# Patient Record
Sex: MALE | Race: BLACK OR AFRICAN AMERICAN | NOT HISPANIC OR LATINO | URBAN - METROPOLITAN AREA
[De-identification: names, ages, dates, MRNs, and addresses within clinical notes are randomized per-mention and may not be internally consistent; named-entity substitution may affect disease eponyms.]

---

## 2021-01-27 ENCOUNTER — EMERGENCY (EMERGENCY)
Facility: HOSPITAL | Age: 46
LOS: 1 days | Discharge: HOME | End: 2021-01-27
Attending: STUDENT IN AN ORGANIZED HEALTH CARE EDUCATION/TRAINING PROGRAM
Payer: MEDICAID

## 2021-01-27 VITALS
TEMPERATURE: 98 F | OXYGEN SATURATION: 100 % | DIASTOLIC BLOOD PRESSURE: 96 MMHG | SYSTOLIC BLOOD PRESSURE: 148 MMHG | HEART RATE: 61 BPM

## 2021-01-27 VITALS
TEMPERATURE: 98 F | OXYGEN SATURATION: 94 % | HEART RATE: 94 BPM | RESPIRATION RATE: 18 BRPM | SYSTOLIC BLOOD PRESSURE: 116 MMHG | DIASTOLIC BLOOD PRESSURE: 66 MMHG

## 2021-01-27 LAB — SARS-COV-2 RNA SPEC QL NAA+PROBE: SIGNIFICANT CHANGE UP

## 2021-01-27 PROCEDURE — 71045 X-RAY EXAM CHEST 1 VIEW: CPT | Mod: 26

## 2021-01-27 PROCEDURE — 99053 MED SERV 10PM-8AM 24 HR FAC: CPT

## 2021-01-27 PROCEDURE — 99284 EMERGENCY DEPT VISIT MOD MDM: CPT

## 2021-01-27 PROCEDURE — 93010 ELECTROCARDIOGRAM REPORT: CPT

## 2021-01-27 RX ORDER — AZITHROMYCIN 500 MG/1
1000 TABLET, FILM COATED ORAL ONCE
Refills: 0 | Status: COMPLETED | OUTPATIENT
Start: 2021-01-27 | End: 2021-01-27

## 2021-01-27 RX ORDER — CLARITHROMYCIN 500 MG
1 TABLET ORAL
Qty: 20 | Refills: 0
Start: 2021-01-27 | End: 2021-02-05

## 2021-01-27 RX ORDER — AZITHROMYCIN 500 MG/1
500 TABLET, FILM COATED ORAL ONCE
Refills: 0 | Status: COMPLETED | OUTPATIENT
Start: 2021-01-27 | End: 2021-01-27

## 2021-01-27 RX ORDER — ONDANSETRON 8 MG/1
4 TABLET, FILM COATED ORAL ONCE
Refills: 0 | Status: COMPLETED | OUTPATIENT
Start: 2021-01-27 | End: 2021-01-27

## 2021-01-27 RX ADMIN — AZITHROMYCIN 1000 MILLIGRAM(S): 500 TABLET, FILM COATED ORAL at 06:03

## 2021-01-27 RX ADMIN — AZITHROMYCIN 500 MILLIGRAM(S): 500 TABLET, FILM COATED ORAL at 05:51

## 2021-01-27 RX ADMIN — ONDANSETRON 4 MILLIGRAM(S): 8 TABLET, FILM COATED ORAL at 06:03

## 2021-01-27 NOTE — ED PROVIDER NOTE - OBJECTIVE STATEMENT
44 y/o M pt BIBA with no significant PMHx presents to the ED after EMS found him unresponsive. States he smoked a blunt and had one drink and does not recall how he got here or why he is here. No further complaints at this time.

## 2021-01-27 NOTE — ED PROVIDER NOTE - PATIENT PORTAL LINK FT
You can access the FollowMyHealth Patient Portal offered by [facility] by registering at the following website: [email or web address]. By joining [employer]’s FollowMyHealth portal, you will also be able to view your health information using other applications (apps) compatible with our system.

## 2021-01-27 NOTE — ED PROVIDER NOTE - NSFOLLOWUPINSTRUCTIONS_ED_ALL_ED_FT
1) Follow up with your doctor in 1-2 days  2) Return to the ER for worsening or concerning symptoms    Cannabis Abuse     WHAT YOU NEED TO KNOW:    Cannabis (marijuana) abuse is a pattern of use that causes physical or mental problems. Cannabis can make you feel high, happy, or excited. The effects may start right away and last for 3 to 4 hours depending on whether you smoke or eat cannabis.    DISCHARGE INSTRUCTIONS:    Call your local emergency number (911 in the ) if: You have any of the following signs of a heart attack:   •Squeezing, pressure, or pain in your chest      •You may also have any of the following: ?Discomfort or pain in your back, neck, jaw, stomach, or arm      ?Shortness of breath      ?Nausea or vomiting      ?Lightheadedness or a sudden cold sweat        Call your doctor if:   •You cannot fight the urge to use cannabis.      •You have stopped using cannabis, and feel that you cannot cope with your withdrawal symptoms.      •You want help or more information on how to decrease or stop using cannabis.      •You have questions or concerns about your condition or care.      Signs of cannabis abuse:   •Use prevents you from functioning at work or school, or causes you to be absent often or to do poor work      •Use when it is dangerous to be under the effects of the drug, such as when you are driving a vehicle or using machinery       •Problems with the police when you are under the effects of cannabis      •Continuing to use cannabis even when you argue with your family and friends about your use      •More cannabis is needed to give you the high feeling or other effects that you want      •Withdrawal symptoms after you stop using cannabis      How cannabis affects your baby: Cannabis can affect your baby if you use it while you are pregnant. Cannabis use may keep your unborn baby from growing as fast as he or she should. It may harm your unborn baby's eyes and nervous system. When your baby gets older, he or she may have difficulty in school and with solving problems. He or she may also have a poor memory, or problems focusing or paying attention. Your child may be impulsive (reacting without thinking first). He or she may be at an increased risk for depression. He or she may have a higher risk of smoking cigarettes and using cannabis as an adult.    Signs of cannabis withdrawal: Cannabis withdrawal happens when you have used cannabis for a long period of time, and you suddenly take less or stop taking it. Withdrawal symptoms may start on the first day and may last up to 2 weeks. You may have more than one of the following:   •Decreased appetite and weight loss       •Night sweats and trouble sleeping      •Craving for cannabis      •Irritability       •Feeling agitated, anxious, or restless      •Depressed or negative mood      Treatment: Drug treatment or therapy is often used to treat cannabis abuse. You may need to stay in a treatment facility, or you may have outpatient therapy sessions. Therapy can be done with you and a talk therapist or in a group with others. This can help you learn good coping skills and ways to manage stress. The goal is to help you decrease or stop cannabis abuse in manageable steps. Your healthcare provider can give you more information about available drug and therapy treatments.    Follow up with your healthcare provider as directed: Write down your questions so you remember to ask them during your visits.     For more information:   •National [place] on Drug Abuse  [address] [facility]  [place], [id number]  Phone: 1-569.710.8603  Web Address: www.[place].nih.gov    Opioid Use Disorder    WHAT YOU NEED TO KNOW:    Opioid use disorder (OUD) is a condition that causes you to abuse and become dependent on an opioid. Abuse means you continue to use the opioid even though it is hurting you or others. Dependence means your body gets used to the amount you take. OUD prevents you from controlling your opioid use. This causes distress that affects your life. You also have trouble doing daily activities because of physical and mental problems from the opioid. OUD can happen with an illegal opioid such as heroin, or a prescription opioid such as hydrocodone or fentanyl.    DISCHARGE INSTRUCTIONS:    Call your local emergency number (911 in the ), or have someone else call if:   •You have chest pain or trouble breathing.      •You have a seizure.      •You cannot be woken.      Call your doctor if:   •You have trouble staying awake and your breathing is slow or shallow.      •You have a fast, slow, or irregular heartbeat.      •You feel lightheaded or faint.      •Your speech is slurred, or you are confused.      •You have nausea and are vomiting, or you cannot stop vomiting.      •You have balance problems.      •You have questions or concerns about your condition or care.      What you need to know about opioid medicine safety:   •Do not suddenly stop taking the opioid. If you have been taking the opioid for longer than 2 weeks, a sudden stop may cause dangerous side effects. Work with your healthcare provider to decrease your dose slowly.      •Do not take opioids that belong to someone else. The kind or amount that person takes may not be right for you.      •Do not mix opioids with other medicines or alcohol. The combination can cause an overdose, or cause you to stop breathing. Alcohol, sleeping pills, and medicines such as antihistamines can make you sleepy. A combination with opioids can lead to a coma.      •Learn about the signs of an overdose so you know how to respond. Tell others about these signs so they will know what to do if needed. Talk to your healthcare provider about naloxone. You may be able to keep naloxone at home in case of an overdose. Your family and friends can also be trained on how to give it to you if needed.      •Take prescribed opioids exactly as directed. Do not take more than the recommended amount. Do not take it more often than recommended. If you use a pain patch, be sure to remove the old patch before you place a new one. Make sure the patch is not exposed to sunlight. Sunlight speeds up the opioid release from the patch.      •Keep opioids out of the reach of children. Store opioids in a locked cabinet or in a location that children cannot get to.      •Follow instructions for what to do with prescription opioids you do not use. Your healthcare provider will give you instructions for how to dispose of it safely. This helps make sure no one else takes it.      Follow up with your doctor or therapist as directed: Write down your questions so you remember to ask them during your visits.    For support and more information:   •Substance Abuse and Mental Health Services Administration  [address]  [place] [address]  Web Address: [email or web address].gov 1) Follow up with your doctor in 1-2 days  2) Return to the ER for worsening or concerning symptoms      Community Acquired Pneumonia    WHAT YOU NEED TO KNOW:    Community-acquired pneumonia (CAP) is a lung infection that you get outside of a hospital or nursing home setting. Your lungs become inflamed and cannot work well. CAP may be caused by bacteria, viruses, or fungi.     The Lungs         DISCHARGE INSTRUCTIONS:    Return to the emergency department if:   •You are confused and cannot think clearly.      •You have increased trouble breathing.      •Your lips or fingernails turn gray or blue.      Call your doctor if:   •Your symptoms do not get better, or they get worse.      •You are urinating less, or not at all.      •You have questions or concerns about your condition or care.      Medicines:   •Medicines may be given to treat a bacterial, viral, or fungal infection. You may also be given medicines to dilate your bronchial tubes to help you breathe more easily.      •Take your medicine as directed. Contact your healthcare provider if you think your medicine is not helping or if you have side effects. Tell him or her if you are allergic to any medicine. Keep a list of the medicines, vitamins, and herbs you take. Include the amounts, and when and why you take them. Bring the list or the pill bottles to follow-up visits. Carry your medicine list with you in case of an emergency.      Follow up with your doctor within 3 days or as directed: You may need another x-ray. Write down your questions so you remember to ask them during your visits.    Deep breathing and coughing: Deep breathing helps open the air passages in your lungs. Coughing helps bring up mucus from your lungs. Take a deep breath and hold the breath as long as you can. Then push the air out of your lungs with a deep, strong cough. Spit out any mucus you have coughed up. Take 10 deep breaths in a row every hour that you are awake. Remember to follow each deep breath with a cough.     Do not smoke or allow others to smoke around you: Nicotine and other chemicals in cigarettes and cigars can cause lung damage. Ask your healthcare provider for information if you currently smoke and need help to quit. E-cigarettes or smokeless tobacco still contain nicotine. Talk to your healthcare provider before you use these products.     Manage CAP at home:   •Breathe warm, moist air. This helps loosen mucus. Loosely place a warm, wet washcloth over your nose and mouth. A room humidifier may also help make the air moist.      •Drink liquids as directed. Ask your healthcare provider how much liquid to drink each day and which liquids to drink. Liquids help make mucus thin and easier to get out of your body.      •Gently tap your chest. This helps loosen mucus so it is easier to cough. Lie with your head lower than your chest several times a day and tap your chest.       •Get plenty of rest. Rest helps your body heal.       Prevent CAP:          •Wash your hands often. Wash your hands several times each day. Wash after you use the bathroom, change a child's diaper, and before you prepare or eat food. Use soap and water every time. Rub your soapy hands together, lacing your fingers. Wash the front and back of your hands, and in between your fingers. Use the fingers of one hand to scrub under the fingernails of the other hand. Wash for at least 20 seconds. Rinse with warm, running water for several seconds. Then dry your hands with a clean towel or paper towel. Use hand  that contains alcohol if soap and water are not available. Do not touch your eyes, nose, or mouth without washing your hands first.  Handwashing           •Cover a sneeze or cough. Use a tissue that covers your mouth and nose. Throw the tissue away in a trash can right away. Use the bend of your arm if a tissue is not available. Wash your hands well with soap and water or use a hand . Do not stand close to anyone who is sneezing or coughing.      •Clean surfaces often. Clean doorknobs, countertops, cell phones, and other surfaces that are touched often. Use a disinfecting wipe, a single-use sponge, or a cloth you can wash and reuse. Use disinfecting  if you do not have wipes. You can create a disinfecting  by mixing 1 part bleach with 10 parts water.      •Try to avoid people who have a cold or the flu. If you are sick, stay away from others as much as possible.      •Ask about vaccines you may need. You may need a vaccine to help prevent pneumonia. Get an influenza (flu) vaccine every year as soon as recommended, usually in September or October. Your healthcare provider can tell you if you should get any other vaccines, and when to get them.    Cannabis Abuse     WHAT YOU NEED TO KNOW:    Cannabis (marijuana) abuse is a pattern of use that causes physical or mental problems. Cannabis can make you feel high, happy, or excited. The effects may start right away and last for 3 to 4 hours depending on whether you smoke or eat cannabis.    DISCHARGE INSTRUCTIONS:    Call your local emergency number (911 in the ) if: You have any of the following signs of a heart attack:   •Squeezing, pressure, or pain in your chest      •You may also have any of the following: ?Discomfort or pain in your back, neck, jaw, stomach, or arm      ?Shortness of breath      ?Nausea or vomiting      ?Lightheadedness or a sudden cold sweat        Call your doctor if:   •You cannot fight the urge to use cannabis.      •You have stopped using cannabis, and feel that you cannot cope with your withdrawal symptoms.      •You want help or more information on how to decrease or stop using cannabis.      •You have questions or concerns about your condition or care.      Signs of cannabis abuse:   •Use prevents you from functioning at work or school, or causes you to be absent often or to do poor work      •Use when it is dangerous to be under the effects of the drug, such as when you are driving a vehicle or using machinery       •Problems with the police when you are under the effects of cannabis      •Continuing to use cannabis even when you argue with your family and friends about your use      •More cannabis is needed to give you the high feeling or other effects that you want      •Withdrawal symptoms after you stop using cannabis      How cannabis affects your baby: Cannabis can affect your baby if you use it while you are pregnant. Cannabis use may keep your unborn baby from growing as fast as he or she should. It may harm your unborn baby's eyes and nervous system. When your baby gets older, he or she may have difficulty in school and with solving problems. He or she may also have a poor memory, or problems focusing or paying attention. Your child may be impulsive (reacting without thinking first). He or she may be at an increased risk for depression. He or she may have a higher risk of smoking cigarettes and using cannabis as an adult.    Signs of cannabis withdrawal: Cannabis withdrawal happens when you have used cannabis for a long period of time, and you suddenly take less or stop taking it. Withdrawal symptoms may start on the first day and may last up to 2 weeks. You may have more than one of the following:   •Decreased appetite and weight loss       •Night sweats and trouble sleeping      •Craving for cannabis      •Irritability       •Feeling agitated, anxious, or restless      •Depressed or negative mood      Treatment: Drug treatment or therapy is often used to treat cannabis abuse. You may need to stay in a treatment facility, or you may have outpatient therapy sessions. Therapy can be done with you and a talk therapist or in a group with others. This can help you learn good coping skills and ways to manage stress. The goal is to help you decrease or stop cannabis abuse in manageable steps. Your healthcare provider can give you more information about available drug and therapy treatments.    Follow up with your healthcare provider as directed: Write down your questions so you remember to ask them during your visits.     For more information:   •National [place] on Drug Abuse  [address] [facility] [address]  [place], [id number]  Phone: 1-101.571.1685  Web Address: www.[place].nih.gov    Opioid Use Disorder    WHAT YOU NEED TO KNOW:    Opioid use disorder (OUD) is a condition that causes you to abuse and become dependent on an opioid. Abuse means you continue to use the opioid even though it is hurting you or others. Dependence means your body gets used to the amount you take. OUD prevents you from controlling your opioid use. This causes distress that affects your life. You also have trouble doing daily activities because of physical and mental problems from the opioid. OUD can happen with an illegal opioid such as heroin, or a prescription opioid such as hydrocodone or fentanyl.    DISCHARGE INSTRUCTIONS:    Call your local emergency number (911 in the ), or have someone else call if:   •You have chest pain or trouble breathing.      •You have a seizure.      •You cannot be woken.      Call your doctor if:   •You have trouble staying awake and your breathing is slow or shallow.      •You have a fast, slow, or irregular heartbeat.      •You feel lightheaded or faint.      •Your speech is slurred, or you are confused.      •You have nausea and are vomiting, or you cannot stop vomiting.      •You have balance problems.      •You have questions or concerns about your condition or care.      What you need to know about opioid medicine safety:   •Do not suddenly stop taking the opioid. If you have been taking the opioid for longer than 2 weeks, a sudden stop may cause dangerous side effects. Work with your healthcare provider to decrease your dose slowly.      •Do not take opioids that belong to someone else. The kind or amount that person takes may not be right for you.      •Do not mix opioids with other medicines or alcohol. The combination can cause an overdose, or cause you to stop breathing. Alcohol, sleeping pills, and medicines such as antihistamines can make you sleepy. A combination with opioids can lead to a coma.      •Learn about the signs of an overdose so you know how to respond. Tell others about these signs so they will know what to do if needed. Talk to your healthcare provider about naloxone. You may be able to keep naloxone at home in case of an overdose. Your family and friends can also be trained on how to give it to you if needed.      •Take prescribed opioids exactly as directed. Do not take more than the recommended amount. Do not take it more often than recommended. If you use a pain patch, be sure to remove the old patch before you place a new one. Make sure the patch is not exposed to sunlight. Sunlight speeds up the opioid release from the patch.      •Keep opioids out of the reach of children. Store opioids in a locked cabinet or in a location that children cannot get to.      •Follow instructions for what to do with prescription opioids you do not use. Your healthcare provider will give you instructions for how to dispose of it safely. This helps make sure no one else takes it.      Follow up with your doctor or therapist as directed: Write down your questions so you remember to ask them during your visits.    For support and more information:   •Substance Abuse and Mental Health Services Administration  [address]  [place] [address]  Web Address: http://www.[facility].gov

## 2021-01-27 NOTE — ED ADULT TRIAGE NOTE - CHIEF COMPLAINT QUOTE
"I smoked a blunt" as per ems patient found unresponsive with agonal breathing, patient received 6mg narcan IM patient awake to verbal stimuli, patient undressed and placed in gown belongings secured.

## 2021-01-27 NOTE — ED PROVIDER NOTE - CONSTITUTIONAL, MLM
normal... Pt is lethargic, requiring verbal stimulation for brief discussion and then becomes lethargic again.

## 2021-01-27 NOTE — ED PROVIDER NOTE - CLINICAL SUMMARY MEDICAL DECISION MAKING FREE TEXT BOX
Pt with hypoxia and mental status change. Unclear source but likely secondary to illicit substance use.

## 2021-01-27 NOTE — ED ADULT NURSE NOTE - OBJECTIVE STATEMENT
Pt presents to ED s/p overdose. As per EMS and triage, pt smoked a "blunt" but was found unresponsive and with agonal breathing. Pt desaturating in ED, down to 70s/80s on room air. When pt is woken up, he becomes responsive and SpO2 rises. Pt placed on 3L NC supplemental O2 to maintain saturation. Pt refusing IVs and blood draws. MD at the bedside for evaluation.

## 2021-01-27 NOTE — ED PROVIDER NOTE - PROGRESS NOTE DETAILS
Attempted to have lab draw. Patient woke up refusing, stating he hate needles and does not want blood work. Discussed with patient that we are concerned regarding his recurrent hypoxia and somnolence. He states he believes its secondary to substance use and request that we hold off. Will continue to monitor. Patient is more awake and alert. Patient pulse is maintained normal. He continues to state the same story which suggest accidental intoxication. Patient is more awake and alert. Patient pulse is maintained normal on room air. He continues to state the same story which suggest accidental intoxication. CXR is as noted patient does not want to remain for further evaluation. Patient refusing to provide pharmacy. Voiced his understanding of management of his condition but states he does like taking medication or going to hospitals.  Cautioned patient symptoms may be related to covid and in general if he worsens he will need to return.

## 2021-01-27 NOTE — ED ADULT NURSE NOTE - NSIMPLEMENTINTERV_GEN_ALL_ED
Implemented All Fall Risk Interventions:  [place] to call system. Call bell, personal items and telephone within reach. Instruct patient to call for assistance. Room bathroom lighting operational. Non-slip footwear when patient is off stretcher. Physically safe environment: no spills, clutter or unnecessary equipment. Stretcher in lowest position, wheels locked, appropriate side rails in place. Provide visual cue, wrist band, yellow gown, etc. Monitor gait and stability. Monitor for mental status changes and reorient to person, place, and time. Review medications for side effects contributing to fall risk. Reinforce activity limits and safety measures with patient and family.

## 2024-11-13 NOTE — ED ADULT NURSE NOTE - NS PRO PASSIVE SMOKE EXP
Diagnosis:   Bankart lesion of left shoulder, initial encounter (S43.492A)  Hill-Sachs lesion of left shoulder (M21.822)     Post op dx:   Left Shoulder Arthroscopy Latarjet Coracoid transfer Bankart repair with Remplissage        Referring Provider: Alfredo Huffman Date of Evaluation:   9/27/24    Precautions:    Activity Precautions: Shoulder instability protocol  Next MD visit:   none scheduled  Date of Surgery:   9/26/24     7 week post op on 11/14/24   Insurance Primary/Secondary: [employer] / N/A     # Auth Visits: 90 visit max        Subjective: pt reports he did not stretch yesterday and did a little stretching today.      Pain: 0/10 at rest        Objective:         AROM: (* denotes performed with pain)  Shoulder    Flexion: L 145*  Abduction: L 160*  ER at side: 25*  IR in scap plane: L T11        Assessment: Continued to progress pt with endurance and stability exercises. He was introduced to table shoulder taps and chair planks to promote serratus strengthening with good form and tolerance. He was introduced to body blade and drivers to promote endurance. He tolerates tx well. He presented with increased shoulder passive restrictions due to reduced compliance to HEP but improved to post manual stretches.       Goals:   To be met in 6-8 weeks post op   Pt will report improved ability to sleep without waking due to shoulder pain  Pt will improve R shoulder flexion PROM to >150 degrees to be able to reach into shoulder height cabinets when cleared for AROM  Pt will improve R shoulder abduction PROM to >130 degrees to improve ability to don deodorant, don/doff shirts, and wash hair when cleared for AROM  Pt will increase shoulder PROM ER to >80 degrees at 90 deg abduction to reach and fasten seatbelt when cleared for AROM  Pt will be independent and compliant with comprehensive HEP to maintain progress achieved in PT      To be met in 8-10 weeks post op   Pt will improve shoulder flexion AROM to >150 degrees to be  able to reach into overhead cabinets without pain or restriction   Pt will improve shoulder abduction AROM to >130 degrees to improve ability to don deodorant, don/doff shirts, and wash hair   Pt will increase shoulder AROM ER to 80 degrees to be able to reach and fasten seatbelt   Pt will increase shoulder AROM IR to 70 degrees to be able to reach in back pocket, tuck in shirt, and turn steering wheel without pain  Pt will be independent and compliant with comprehensive HEP to maintain progress achieved in PT      To be met 4-6 months post op (total visits of PT: ~35)  Pt will improve shoulder strength throughout to 5/5 to 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    Pt will demonstrate increased mid/low trap strength to 5/5 to promote improved shoulder mechanics and stabilization with performing football drills   Pt will be independent and compliant with comprehensive HEP to maintain progress achieved in PT       Plan: Progress ROM as tolerated and gradual progression in strengthening  Date: 10/29/24   Tx#: 9/35 Date: 11/1/24   Tx#: 10/35 Date: 11/5/24   Tx#: 11/35 Date: 11/8/24   Tx#: 12/35 Date: 11/12/24   Tx#: 13/35 Date: 11/15/24   Tx#: 14/35   TherEx: 60 min  -UBE: 4 min  -standing cane shoulder abduction+ flexion to tolerance horiz position: 2x10 each   -Cane flexion at wall vertical stick full range: x20  -pulleys: 2'/2'  -behind the back stretch: 5 sec x20  -90/90 ER stretch at wall: x20  -AROM shoulder abd, flexion, scaption to 90: 0#, x20 each  -wall wash flexion/abduction: x15 each   -IR/ER GTB walkouts: x20 each-hold out of time  -IR/ER isotonic to mid rage RTB: 2x12-hold out of time  -S/L AROM abduction+ ER: 2#, 2x10 each  -prone on bench row #3 and shoulder ext to neutral 1#, reverse fly's 0#: 2x10 each-hold out of time  -R/S at 90 flexion, IR/ER in scap plane: 2x30 sec  -PROM (L) shoulder to tolerance TherEx: 60 min  -UBE: 4 min  -pulleys: 2'/2'   -behind the back  stretch: 10 sec x10  -90/90 ER stretch at wall in abduction and flexion, at side: 3x30 sec  -TRX I, Y, T's: x10 each  -AROM shoulder abd, flexion, scaption: 0#, x20 each  -wall wash flexion/abduction: x15 each   -IR/ER GTB walkouts: x20 each-hold out of time  -IR/ER isotonic to mid rage RTB: 2x12-hold out of time  -S/L AROM abduction+ ER: 2#, 2x10 each-hold out of time  -prone on bench row #7 and shoulder ext to neutral 5#, reverse fly's 3#, Y's 0#: 2x10 each  -R/S at 90 flexion, IR/ER in scap plane: 2x30 sec  -PROM (L) shoulder to tolerance TherEx: 70 min  -UBE: 4 min, level 2  -pulleys: abduction 2'  -behind the back stretch: 10 sec x10  -90/90 ER stretch at wall in abduction and flexion, at side: 3x30 sec  -TRX I, Y: x10 each  -AROM full range shoulder abd, flexion, scaption: 0#, x20 each  -Standing flexion + abd: 1#, 2x10 each  -OH press focus on neutral ER: x10  -wall wash circles: 2#, 2x to fatigue   -IR/ER BLK/GTB walkouts: x30 each  -IR/ER isotonic:  BLK/GTB: 3x12  -S/L ER: 2#, 3x10 each  -prone on bench row #7 and shoulder ext to neutral 5#, reverse fly's 3#, Y's 2#: 3x10 each  -R/S at 90 flexion, IR/ER in scap plane: 2x30 sec  -PROM (L) shoulder to tolerance TherEx: 60 min  -UBE: 4 min, level 3  -90/90 ER stretch at wall in abduction and flexion, at side: 3x30 sec  -TRX I, Y: x10 each  -Standing flexion + scaption +abd: 2#, 2x10 each  -IR/ER BLK/GTB walkouts: x30 each-hold  -IR/ER isotonic:  BLK/GTB: 3x12-hold  -S/L ER: 2#, 3x10 each  -prone on bench row #10 and shoulder ext to neutral 5#, reverse fly's 3#, Y's 2#: 3x10 each  -Supine on bench fly's 5#, chest press 10#: 3x10  -PROM (L) shoulder to tolerance TherEx: 25 min  -UBE: 4 min, level 4  -90/90 ER stretch at wall in abduction and flexion, at side: 3x30 sec  -TRX I, Y: x10 each  -Standing flexion + scaption +abd: 3#, 2x15 each  -IR/ER BLK/GTB walkouts: x30 each  -IR/ER isotonic:  BLK/GTB: 3x12  -S/L ER: 2#, 3x10 each-hold  -prone on bench row #10 and  Unknown shoulder ext to neutral 5#, reverse fly's 3#, Y's 2#: 3x10 each-hold  -Supine on bench fly's 5#, chest press 10#: 3x10-hold  -PROM (L) shoulder to tolerance TherEx: 35 min  -UBE: 4 min, level 4  -90/90 ER stretch at wall in abduction and flexion, at side: 3x30 sec  -TRX I, Y: x10 each  -Standing flexion + scaption +abd: 3#, 2x15 each  -IR/ER BLK/GTB walkouts: x30 each  -IR/ER isotonic:  BLK/GTB: 3x12  -prone on bench shoulder ext to neutral 5#, reverse fly's 3#, Y's 2#: 3x10 each  -Supine on bench fly's 5#, chest press 10#: 3x10-hold  -PROM (L) shoulder to tolerance      Neuro re-ed: 15 min  -R/S at 90 flexion, IR/ER in scap plane: 2x30 sec  -OH press focus on neutral ER: x10  -wall wash circles: 2#, 2x to fatigue   -Shoulder flexion (arms extended) w/ YTB loop: 2x to fatigue  -YSB wall circles: 2x20 each way Neuro re-ed: 25 min  -R/S at 90 flexion, IR/ER in scap plane: 2x30 sec  -OH press focus on neutral ER: x10  -wall wash circles: 3#, 2x to fatigue   -Shoulder flexion (arms extended) w/ YTB loop: 2x to fatigue  -YSB wall circles: 2x20 each way  -flutters: YTB, 0-90 de x to fatigue   -drivers: YTB, 2x to fatigue-hold  -Wall push ups: 3x10  -Serratus foam roll up the wall w/ YTB: 2x10 Neuro re-ed: 40 min  -R/S at 90 flexion, IR/ER in scap plane: 2x30 sec  -OH press focus on neutral ER: YTB 2x fatigue  -wall wash circles: 3#, 2x to fatigue-hold   -YSB wall circles: 2x20 each way-hold  -flutters: YTB, 0-90 de x to fatigue   -drivers: YTB, 2x to fatigue  -Body blade: 3x30 sec IR/ER at side and Flexion up/down  -bar push ups: 3x10  -table shoulder taps:2x to fatigue  -plank on chair: 3x30 sec             Vasopneumatic compression:  - Game Ready - L shoulder - 10'       HEP:   Access Code: [email or web address]  URL: [email or web address]  Date: 2024  Prepared by: Isamar Hernandez    Exercises  - Supine Shoulder External Rotation in Scaption AAROM  - 3 x daily - 7 x weekly - 3 sets - 10 reps  -  Sidelying External Rotation Stretch   - 3 x daily - 7 x weekly - 3 sets - 10 reps  - Sidelying Shoulder ER with Towel and Dumbbell  - 1 x daily - 5 x weekly - 3 sets - 10 reps  - Shoulder External Rotation with Anchored Resistance  - 1 x daily - 5 x weekly - 3 sets - 10 reps  - Shoulder External Rotation Reactive Isometrics  - 1 x daily - 5 x weekly - 3 sets - 10 reps  - Shoulder Abduction with Dumbbells - Palms Down  - 1 x daily - 5 x weekly - 3 sets - 10 reps  - Scaption with Dumbbells  - 1 x daily - 5 x weekly - 3 sets - 10 reps  - Standing Shoulder Flexion to 90 Degrees with Dumbbells  - 1 x daily - 5 x weekly - 3 sets - 10 reps  - Wall Push Up with Plus  - 1 x daily - 5 x weekly - 3 sets - 10 reps  - Prone Shoulder Row  - 1 x daily - 5 x weekly - 3 sets - 10 reps  - Prone Elbow Extension with Dumbbell  - 1 x daily - 5 x weekly - 3 sets - 10 reps  - Prone Single Arm Shoulder Horizontal Abduction with Dumbbell - Palm Down  - 1 x daily - 5 x weekly - 3 sets - 10 reps  - Prone Single Arm Shoulder Y with Dumbbell (Mirrored)  - 1 x daily - 5 x weekly - 3 sets - 10 reps     Charges: TherEx: 2 units, neuro re-ed: 3 unit  Total Timed Treatment: 75 min         Total Treatment Time: 75 min